# Patient Record
Sex: FEMALE | Race: OTHER | Employment: FULL TIME | URBAN - NONMETROPOLITAN AREA
[De-identification: names, ages, dates, MRNs, and addresses within clinical notes are randomized per-mention and may not be internally consistent; named-entity substitution may affect disease eponyms.]

---

## 2020-12-18 ENCOUNTER — HOSPITAL ENCOUNTER (EMERGENCY)
Age: 35
Discharge: HOME | End: 2020-12-18

## 2020-12-18 VITALS
OXYGEN SATURATION: 98 % | TEMPERATURE: 97.3 F | BODY MASS INDEX: 20.81 KG/M2 | SYSTOLIC BLOOD PRESSURE: 117 MMHG | RESPIRATION RATE: 16 BRPM | HEIGHT: 61 IN | HEART RATE: 80 BPM | DIASTOLIC BLOOD PRESSURE: 80 MMHG | WEIGHT: 110.23 LBS

## 2020-12-18 PROCEDURE — 99203 OFFICE O/P NEW LOW 30 MIN: CPT

## 2020-12-18 PROCEDURE — U0003 INFECTIOUS AGENT DETECTION BY NUCLEIC ACID (DNA OR RNA); SEVERE ACUTE RESPIRATORY SYNDROME CORONAVIRUS 2 (SARS-COV-2) (CORONAVIRUS DISEASE [COVID-19]), AMPLIFIED PROBE TECHNIQUE, MAKING USE OF HIGH THROUGHPUT TECHNOLOGIES AS DESCRIBED BY CMS-2020-01-R: HCPCS

## 2020-12-18 RX ORDER — ACETAMINOPHEN 500 MG
500 TABLET ORAL EVERY 6 HOURS PRN
COMMUNITY

## 2020-12-18 RX ORDER — IBUPROFEN 200 MG
200 TABLET ORAL EVERY 6 HOURS PRN
COMMUNITY
End: 2021-04-16

## 2020-12-18 ASSESSMENT — ENCOUNTER SYMPTOMS
NAUSEA: 0
CHEST TIGHTNESS: 0
SORE THROAT: 0
COUGH: 1
VOMITING: 0
DIARRHEA: 0
WHEEZING: 0
ABDOMINAL PAIN: 0
SHORTNESS OF BREATH: 0

## 2020-12-18 NOTE — ED PROVIDER NOTES
[address]  Urgent Care Encounter       CHIEF COMPLAINT       Chief Complaint   Patient presents with    Concern For COVID-19       Nurses Notes reviewed and I agree except as noted in the HPI. HISTORY OF PRESENT ILLNESS   Karuna Chacon is a 38 y.o. female who presents     Patient is present in the urgent care with concerns for possible Covid. Use of  is used during interview and assessment. Patient does have nonproductive cough that she has had intermittently for the last 2 to 3 days. Patient states that her place of employment has had recent outbreak of Covid, therefore she would like to be tested. She denies any fevers, or loss of smell or taste. Denies any body aches. Denies any history of ever smoking. REVIEW OF SYSTEMS     Review of Systems   Constitutional: Negative for chills, fatigue and fever. HENT: Negative for congestion, postnasal drip and sore throat. Respiratory: Positive for cough (Nonproductive, intermittent for the last 2 to 3 days). Negative for chest tightness, shortness of breath and wheezing. Cardiovascular: Negative for chest pain. Gastrointestinal: Negative for abdominal pain, diarrhea, nausea and vomiting. Musculoskeletal: Negative for myalgias. Skin: Negative for rash. Neurological: Negative for headaches. PAST MEDICAL HISTORY   History reviewed. No pertinent past medical history. SURGICALHISTORY     Patient  has no past surgical history on file. CURRENT MEDICATIONS       Discharge Medication List as of 12/18/2020  1:29 PM      CONTINUE these medications which have NOT CHANGED    Details   acetaminophen (TYLENOL) 500 MG tablet Take 500 mg by mouth every 6 hours as needed for PainHistorical Med      ibuprofen (ADVIL;MOTRIN) 200 MG tablet Take 200 mg by mouth every 6 hours as needed for PainHistorical Med             ALLERGIES     Patient is has No Known Allergies.     Patients   There is no immunization history on file for this patient. FAMILY HISTORY     Patient's family history is not on file. SOCIAL HISTORY     Patient  reports that she has never smoked. She has never used smokeless tobacco. She reports previous alcohol use. She reports previous drug use. PHYSICAL EXAM     ED TRIAGE VITALS  BP: 117/80, Temp: 97.3 °F (36.3 °C), Pulse: 80, Resp: 16, SpO2: 98 %,Estimated body mass index is 20.83 kg/m² as calculated from the following:    Height as of this encounter: 5' 1\" (1.549 m). Weight as of this encounter: 110 lb 3.7 oz (50 kg). ,No LMP recorded. Physical Exam  Constitutional:       General: She is not in acute distress. Appearance: Normal appearance. She is not ill-appearing, toxic-appearing or diaphoretic. HENT:      Nose: Nose normal.   Cardiovascular:      Rate and Rhythm: Normal rate. Pulses: Normal pulses. Heart sounds: Normal heart sounds. No murmur. No friction rub. No gallop. Pulmonary:      Effort: Pulmonary effort is normal. No respiratory distress. Breath sounds: Normal breath sounds. No stridor. No wheezing, rhonchi or rales. Chest:      Chest wall: No tenderness. Musculoskeletal: Normal range of motion. Skin:     General: Skin is warm. Neurological:      General: No focal deficit present. Mental Status: She is alert and oriented to person, place, and time. Sensory: No sensory deficit. Psychiatric:         Mood and Affect: Mood normal.         Behavior: Behavior normal.         Thought Content: Thought content normal.         Judgment: Judgment normal.         DIAGNOSTIC RESULTS     Labs:No results found for this visit on 12/18/20.     IMAGING:    No orders to display     URGENT CARE COURSE:     Vitals:    12/18/20 1301 12/18/20 1308   BP:  117/80   Pulse:  80   Resp:  16   Temp:  97.3 °F (36.3 °C)   TempSrc:  Temporal   SpO2:  98%   Weight: 110 lb 3.7 oz (50 kg)    Height: 5' 1\" (1.549 m)        Medications - No data to display PROCEDURES:  None    FINAL IMPRESSION      1. Suspected COVID-19 virus infection    2. Educated about COVID-19 virus infection    3. Exposure to COVID-19 virus    4. COVID-19 ruled out by laboratory testing          DISPOSITION/ PLAN   Patient is discharged home with instructions to self quarantine for the next 3 to 5 days with her pending COVID-19 test.  She should follow-up with her primary care provider if there is a positive Covid results, as well as health department. Recommend over-the-counter Tylenol, decongestants, and adequate fluid hydration for management of symptoms. If there is any shortness of breath, or fevers that cannot be controlled with over-the-counter Tylenol she go directly to the ER. PATIENT REFERRED TO:  No primary care provider on file. No primary physician on file.       DISCHARGE MEDICATIONS:  Discharge Medication List as of 12/18/2020  1:29 PM          Discharge Medication List as of 12/18/2020  1:29 PM          Discharge Medication List as of 12/18/2020  1:29 PM          RAYMON Gannon NP    (Please note that portions of this note were completed with a voice recognition program. Efforts were made to edit the dictations but occasionally words are mis-transcribed.)          RAYMON Loyola NP  12/18/20 [id number]

## 2020-12-19 ENCOUNTER — CARE COORDINATION (OUTPATIENT)
Dept: CARE COORDINATION | Age: 35
End: 2020-12-19

## 2020-12-20 LAB — SARS-COV-2: DETECTED

## 2020-12-21 NOTE — CARE COORDINATION
Second attempt in trying to reach out to the patient for a covid precaution call. No answer or voicemail, I have located the  number if needed. I also reached out to Germaine Ballesteros RN to assist me in giving the patient a positive Covid result if the patient does reach back out to me.

## 2021-04-16 ENCOUNTER — APPOINTMENT (OUTPATIENT)
Dept: ULTRASOUND IMAGING | Age: 36
End: 2021-04-16

## 2021-04-16 ENCOUNTER — HOSPITAL ENCOUNTER (EMERGENCY)
Age: 36
Discharge: HOME | End: 2021-04-16
Attending: EMERGENCY MEDICINE

## 2021-04-16 VITALS
HEART RATE: 70 BPM | TEMPERATURE: 98 F | BODY MASS INDEX: 21.73 KG/M2 | RESPIRATION RATE: 16 BRPM | SYSTOLIC BLOOD PRESSURE: 100 MMHG | DIASTOLIC BLOOD PRESSURE: 68 MMHG | OXYGEN SATURATION: 99 % | WEIGHT: 115 LBS

## 2021-04-16 DIAGNOSIS — N93.9 VAGINAL BLEEDING: ICD-10-CM

## 2021-04-16 DIAGNOSIS — O02.1 MISSED ABORTION: Primary | ICD-10-CM

## 2021-04-16 LAB
ABO: NORMAL
ANION GAP SERPL CALCULATED.3IONS-SCNC: 9 MEQ/L (ref 8–16)
ANTIBODY SCREEN: NORMAL
APTT: 25.9 SECONDS (ref 22–38)
BACTERIA: ABNORMAL
BASOPHILS # BLD: 0.5 %
BASOPHILS ABSOLUTE: 0.1 THOU/MM3 (ref 0–0.1)
BILIRUBIN URINE: NEGATIVE
BLOOD, URINE: ABNORMAL
BUN BLDV-MCNC: 8 MG/DL (ref 7–22)
CALCIUM SERPL-MCNC: 9.1 MG/DL (ref 8.5–10.5)
CASTS: ABNORMAL /LPF
CASTS: ABNORMAL /LPF
CHARACTER, URINE: ABNORMAL
CHLORIDE BLD-SCNC: 105 MEQ/L (ref 98–111)
CHP ED QC CHECK: NORMAL
CO2: 23 MEQ/L (ref 23–33)
COLOR: YELLOW
CREAT SERPL-MCNC: 0.7 MG/DL (ref 0.4–1.2)
CRYSTALS: ABNORMAL
EKG ATRIAL RATE: 72 BPM
EKG P AXIS: 57 DEGREES
EKG P-R INTERVAL: 154 MS
EKG Q-T INTERVAL: 388 MS
EKG QRS DURATION: 84 MS
EKG QTC CALCULATION (BAZETT): 424 MS
EKG R AXIS: 76 DEGREES
EKG T AXIS: 52 DEGREES
EKG VENTRICULAR RATE: 72 BPM
EOSINOPHIL # BLD: 2.1 %
EOSINOPHILS ABSOLUTE: 0.2 THOU/MM3 (ref 0–0.4)
EPITHELIAL CELLS, UA: ABNORMAL /HPF
ERYTHROCYTE [DISTWIDTH] IN BLOOD BY AUTOMATED COUNT: 12.6 % (ref 11.5–14.5)
ERYTHROCYTE [DISTWIDTH] IN BLOOD BY AUTOMATED COUNT: 42 FL (ref 35–45)
GFR SERPL CREATININE-BSD FRML MDRD: > 90 ML/MIN/1.73M2
GLUCOSE BLD-MCNC: 118 MG/DL (ref 70–108)
GLUCOSE BLD-MCNC: 123 MG/DL
GLUCOSE BLD-MCNC: 123 MG/DL (ref 70–108)
GLUCOSE, URINE: NEGATIVE MG/DL
HCG,BETA SUBUNIT,QUAL,SERUM: 1812 MIU/ML (ref 0–5)
HCT VFR BLD CALC: 38.8 % (ref 37–47)
HEMOGLOBIN: 12.6 GM/DL (ref 12–16)
IMMATURE GRANS (ABS): 0.04 THOU/MM3 (ref 0–0.07)
IMMATURE GRANULOCYTES: 0.4 %
INR BLD: 1.01 (ref 0.85–1.13)
KETONES, URINE: NEGATIVE
KOH PREP: NORMAL
LEUKOCYTE EST, POC: NEGATIVE
LYMPHOCYTES # BLD: 16.7 %
LYMPHOCYTES ABSOLUTE: 1.8 THOU/MM3 (ref 1–4.8)
MCH RBC QN AUTO: 29.8 PG (ref 26–33)
MCHC RBC AUTO-ENTMCNC: 32.5 GM/DL (ref 32.2–35.5)
MCV RBC AUTO: 91.7 FL (ref 81–99)
MISCELLANEOUS LAB TEST RESULT: ABNORMAL
MONOCYTES # BLD: 4.3 %
MONOCYTES ABSOLUTE: 0.5 THOU/MM3 (ref 0.4–1.3)
NITRITE, URINE: NEGATIVE
NUCLEATED RED BLOOD CELLS: 0 /100 WBC
OSMOLALITY CALCULATION: 273.2 MOSMOL/KG (ref 275–300)
PH UA: 8 (ref 5–9)
PLATELET # BLD: 337 THOU/MM3 (ref 130–400)
PMV BLD AUTO: 9 FL (ref 9.4–12.4)
POTASSIUM REFLEX MAGNESIUM: 4.1 MEQ/L (ref 3.5–5.2)
PREGNANCY, SERUM: POSITIVE
PROTEIN UA: NEGATIVE MG/DL
RBC # BLD: 4.23 MILL/MM3 (ref 4.2–5.4)
RBC URINE: ABNORMAL /HPF
RENAL EPITHELIAL, UA: ABNORMAL
RH FACTOR: NORMAL
SEG NEUTROPHILS: 76 %
SEGMENTED NEUTROPHILS ABSOLUTE COUNT: 8.1 THOU/MM3 (ref 1.8–7.7)
SODIUM BLD-SCNC: 137 MEQ/L (ref 135–145)
SPECIFIC GRAVITY UA: 1.01 (ref 1–1.03)
TRICHOMONAS PREP: NORMAL
TROPONIN T: < 0.01 NG/ML
UROBILINOGEN, URINE: 0.2 EU/DL (ref 0–1)
WBC # BLD: 10.7 THOU/MM3 (ref 4.8–10.8)
WBC UA: ABNORMAL /HPF
YEAST: ABNORMAL

## 2021-04-16 PROCEDURE — 99284 EMERGENCY DEPT VISIT MOD MDM: CPT

## 2021-04-16 PROCEDURE — 84484 ASSAY OF TROPONIN QUANT: CPT

## 2021-04-16 PROCEDURE — 81001 URINALYSIS AUTO W/SCOPE: CPT

## 2021-04-16 PROCEDURE — 86850 RBC ANTIBODY SCREEN: CPT

## 2021-04-16 PROCEDURE — 87253 VIRUS INOCULATE TISSUE ADDL: CPT

## 2021-04-16 PROCEDURE — 86900 BLOOD TYPING SEROLOGIC ABO: CPT

## 2021-04-16 PROCEDURE — 2580000003 HC RX 258: Performed by: STUDENT IN AN ORGANIZED HEALTH CARE EDUCATION/TRAINING PROGRAM

## 2021-04-16 PROCEDURE — 80048 BASIC METABOLIC PNL TOTAL CA: CPT

## 2021-04-16 PROCEDURE — 36415 COLL VENOUS BLD VENIPUNCTURE: CPT

## 2021-04-16 PROCEDURE — 93005 ELECTROCARDIOGRAM TRACING: CPT | Performed by: STUDENT IN AN ORGANIZED HEALTH CARE EDUCATION/TRAINING PROGRAM

## 2021-04-16 PROCEDURE — 86901 BLOOD TYPING SEROLOGIC RH(D): CPT

## 2021-04-16 PROCEDURE — 87491 CHLMYD TRACH DNA AMP PROBE: CPT

## 2021-04-16 PROCEDURE — 87252 VIRUS INOCULATION TISSUE: CPT

## 2021-04-16 PROCEDURE — 85610 PROTHROMBIN TIME: CPT

## 2021-04-16 PROCEDURE — 87210 SMEAR WET MOUNT SALINE/INK: CPT

## 2021-04-16 PROCEDURE — 99215 OFFICE O/P EST HI 40 MIN: CPT

## 2021-04-16 PROCEDURE — 6370000000 HC RX 637 (ALT 250 FOR IP): Performed by: STUDENT IN AN ORGANIZED HEALTH CARE EDUCATION/TRAINING PROGRAM

## 2021-04-16 PROCEDURE — 84702 CHORIONIC GONADOTROPIN TEST: CPT

## 2021-04-16 PROCEDURE — 87591 N.GONORRHOEAE DNA AMP PROB: CPT

## 2021-04-16 PROCEDURE — 85730 THROMBOPLASTIN TIME PARTIAL: CPT

## 2021-04-16 PROCEDURE — 87205 SMEAR GRAM STAIN: CPT

## 2021-04-16 PROCEDURE — 85025 COMPLETE CBC W/AUTO DIFF WBC: CPT

## 2021-04-16 PROCEDURE — 84703 CHORIONIC GONADOTROPIN ASSAY: CPT

## 2021-04-16 PROCEDURE — 87070 CULTURE OTHR SPECIMN AEROBIC: CPT

## 2021-04-16 PROCEDURE — 82948 REAGENT STRIP/BLOOD GLUCOSE: CPT

## 2021-04-16 PROCEDURE — 93010 ELECTROCARDIOGRAM REPORT: CPT | Performed by: NUCLEAR MEDICINE

## 2021-04-16 PROCEDURE — 76817 TRANSVAGINAL US OBSTETRIC: CPT

## 2021-04-16 PROCEDURE — 87529 HSV DNA AMP PROBE: CPT

## 2021-04-16 PROCEDURE — 87220 TISSUE EXAM FOR FUNGI: CPT

## 2021-04-16 RX ORDER — 0.9 % SODIUM CHLORIDE 0.9 %
1000 INTRAVENOUS SOLUTION INTRAVENOUS ONCE
Status: COMPLETED | OUTPATIENT
Start: 2021-04-16 | End: 2021-04-16

## 2021-04-16 RX ORDER — ACETAMINOPHEN 500 MG
1000 TABLET ORAL ONCE
Status: COMPLETED | OUTPATIENT
Start: 2021-04-16 | End: 2021-04-16

## 2021-04-16 RX ADMIN — SODIUM CHLORIDE 1000 ML: 9 INJECTION, SOLUTION INTRAVENOUS at 12:30

## 2021-04-16 RX ADMIN — ACETAMINOPHEN 1000 MG: 500 TABLET ORAL at 13:58

## 2021-04-16 ASSESSMENT — ENCOUNTER SYMPTOMS
SHORTNESS OF BREATH: 0
RHINORRHEA: 0
BACK PAIN: 0
EYE REDNESS: 0
BLOOD IN STOOL: 0
SINUS PAIN: 0
DIARRHEA: 0
VOMITING: 0
COUGH: 0
ABDOMINAL DISTENTION: 0
SORE THROAT: 0
NAUSEA: 0
CONSTIPATION: 0
TROUBLE SWALLOWING: 0
ABDOMINAL PAIN: 1

## 2021-04-16 ASSESSMENT — PAIN DESCRIPTION - ORIENTATION: ORIENTATION: LEFT

## 2021-04-16 ASSESSMENT — PAIN SCALES - GENERAL: PAINLEVEL_OUTOF10: 7

## 2021-04-16 ASSESSMENT — PAIN DESCRIPTION - PAIN TYPE: TYPE: ACUTE PAIN

## 2021-04-16 NOTE — ED NOTES
Giovanni Sanchez transferred via ambulance to [facility]'s report given to ambulance crew.      Jese Kamara LPN  48/07/74 [id number]

## 2021-04-16 NOTE — ED TRIAGE NOTES
Patient arrived to room 15 via [facility] transfer with c/o of vaginal bleeding. Patient needs to use  for communication.

## 2021-04-16 NOTE — ED PROVIDER NOTES
[facility] ENCOUNTER          Pt Name: Lynda Mata  MRN: [id number]  [place] 1985  Date of evaluation: 2021  Treating Resident Physician: Kia Malave MD  Supervising Physician: Dr. Que Zamorano       Chief Complaint   Patient presents with    Vaginal Bleeding     reported 3 month pregnancy     History obtained from the patient and  via an . HISTORY OF PRESENT ILLNESS    HPI  Lynda Mata is a 43 y.o. female who is G3,  who believes she is approximately 12 weeks pregnant based on a last known menstrual cycle of  that was normal for her who presents to the emergency department for evaluation of vaginal bleeding since last night going through approximately 8 pads. She also mentions passing to golf ball size clots this a.m. She also mentions having a syncopal episode today when she was sitting on the toilet and stood up states she woke up on the floor. Mentioned having some lightheadedness prior to the incident. She also complains of some bilateral breast pain that is nonradiating and nonexertional.    She denies any shortness of breath, palpitations, blood in her stool, dysuria, nausea, vomiting or diarrhea. She also denies having any headaches or vision changes. She is not followed by any obstetrician here and mentions in the past leaving her previous child to [facility] as well as her 1  states there is a story about her having a fall 4 years ago being seen in the hospital [facility] and having issues with the pregnancy described as cramping and bleeding and was advised by the physician there she needs an  and was given a pill to induce such. She denies any other complications with the pregnancy. The patient has no other acute complaints at this time. REVIEW OF SYSTEMS   Review of Systems   Constitutional: Negative for chills and fever.    HENT: Negative for congestion, rhinorrhea, sinus pain and sore throat. Eyes: Negative for redness. Respiratory: Negative for cough and shortness of breath. Cardiovascular: Positive for chest pain. Negative for palpitations and leg swelling. Gastrointestinal: Positive for abdominal pain. Negative for blood in stool, diarrhea, nausea and vomiting. Genitourinary: Positive for pelvic pain and vaginal bleeding. Negative for dysuria and vaginal discharge. Musculoskeletal: Negative for back pain. Skin: Negative for rash. Neurological: Positive for syncope and light-headedness. Negative for headaches. Psychiatric/Behavioral: Negative for agitation. PAST MEDICAL AND SURGICAL HISTORY   History reviewed. No pertinent past medical history. History reviewed. No pertinent surgical history. MEDICATIONS   No current facility-administered medications for this encounter. Current Outpatient Medications:     acetaminophen (TYLENOL) 500 MG tablet, Take 500 mg by mouth every 6 hours as needed for Pain, Disp: , Rfl:       SOCIAL HISTORY     Social History     Social History Narrative    Not on file     Social History     Tobacco Use    Smoking status: Never Smoker    Smokeless tobacco: Never Used   Substance Use Topics    Alcohol use: Not Currently    Drug use: Not Currently         ALLERGIES     Allergies   Allergen Reactions    Avelox [Moxifloxacin]      Per interpretor difficult to understand but believed to be correct         FAMILY HISTORY   History reviewed. No pertinent family history. PREVIOUS RECORDS   Previous records reviewed: She was seen here on 12/18/2020 for suspected COVID-19 virus.       PHYSICAL EXAM     ED Triage Vitals   BP Temp Temp Source Pulse Resp SpO2 Height Weight   04/16/21 1011 04/16/21 1011 04/16/21 1011 04/16/21 1011 04/16/21 1011 04/16/21 1011 -- 04/16/21 1058   110/75 98 °F (36.7 °C) Temporal 87 16 98 %  115 lb (52.2 kg)     Initial vital signs and nursing assessment reviewed and normal. Pulsoximetry is normal per my interpretation. Additional Vital Signs:  Vitals:    04/16/21 1357   BP: 99/62   Pulse: 74   Resp: 16   Temp:    SpO2: 99%       Physical Exam  Vitals signs and nursing note reviewed. Exam conducted with a chaperone present. Constitutional:       Appearance: She is ill-appearing. HENT:      Head: Normocephalic and atraumatic. Right Ear: External ear normal.      Left Ear: External ear normal.      Nose: Nose normal.      Mouth/Throat:      Mouth: Mucous membranes are dry. Pharynx: Oropharynx is clear. Eyes:      General: No scleral icterus. Conjunctiva/sclera: Conjunctivae normal.   Neck:      Musculoskeletal: Normal range of motion and neck supple. No neck rigidity or muscular tenderness. Cardiovascular:      Rate and Rhythm: Normal rate and regular rhythm. Pulses: Normal pulses. Heart sounds: Normal heart sounds. No murmur. Pulmonary:      Effort: Pulmonary effort is normal. No respiratory distress. Breath sounds: Normal breath sounds. No wheezing or rales. Chest:      Chest wall: No tenderness. Abdominal:      General: Abdomen is flat. Bowel sounds are normal. There is no distension. Palpations: Abdomen is soft. There is mass. Tenderness: There is abdominal tenderness. There is no guarding or rebound. Comments: Bilateral pelvic tenderness to palpation, there is also supra pelvic palpable mass that is firm, nontender. Genitourinary:     General: Normal vulva. Exam position: Lithotomy position. Vagina: No foreign body. No vaginal discharge or prolapsed vaginal walls. Cervix: Dilated. Cervical bleeding present. No cervical motion tenderness, discharge, friability, lesion or erythema. Musculoskeletal: Normal range of motion. Lymphadenopathy:      Cervical: No cervical adenopathy. Skin:     General: Skin is warm and dry.       Capillary Refill: Capillary refill takes less than 2 seconds. Neurological:      General: No focal deficit present. Mental Status: She is alert and oriented to person, place, and time. Psychiatric:         Mood and Affect: Mood normal.       MEDICAL DECISION MAKING   Initial Assessment: This is a 68-year-old female who is a G3, P1 AB 1 who is approximately 12 to 15 weeks pregnant per her last menstrual cycle being January 16 that was normal.  She has had no confirmatory test that she is pregnant has had no OB management of her current pregnancy. She is coming today for vaginal bleeding since yesterday as well as passing to clots the size of golf balls today. She is gone through 8 pads and had a syncopal episode when standing up from the toilet today. She also has some bilateral chest pain below both breasts as nonradiating and nonexertional in nature. Differential Diagnosis Included but not limited to: Anemia, syncope pregnancy, vasovagal, orthostatic hypotension, ectopic pregnancy, normal pregnancy, molar pregnancy, ovarian cyst, pulmonary embolism    MDM:   We will obtain some laboratory studies including a type and screen ABO/Rh. Also obtain EKG with patient's findings of chest pain. Patient is a poor historian and throughout our discussion with the  her significant other in the room continues to answer most of the questions for her. She is currently having some vaginal pain will order some Tylenol. Will also perform a bedside ultrasound in addition to the official ultrasound will be obtained.         ED RESULTS   Laboratory results:  Labs Reviewed   BASIC METABOLIC PANEL W/ REFLEX TO MG FOR LOW K - Abnormal; Notable for the following components:       Result Value    Glucose 118 (*)     All other components within normal limits   CBC WITH AUTO DIFFERENTIAL - Abnormal; Notable for the following components:    MPV 9.0 (*)     Segs Absolute 8.1 (*)     All other components within normal limits   HCG, QUANTITATIVE, PREGNANCY - Abnormal; Neutrophils 76.0   Lymphocytes 16.7   Monocytes 4.3   Eosinophils 2.1   Basophils 0.5   Immature Granulocytes 0.4   Segs Absolute 8.1(!)   Lymphocytes Absolute 1.8   Monocytes Absolute 0.5   Eosinophils Absolute 0.2   Basophils Absolute 0.1   Immature Grans (Abs) 0.04   Nucleated Red Blood Cells 0 [AL]   1255 Anion Gap: 9.0 [AL]   1255 Osmolality Calc(!): 273.2 [AL]   1255 aPTT: 25.9 [AL]   1255 Within normal limits. Basic Metabolic Panel w/ Reflex to MG(!):    Sodium 137   Potassium 4.1   Chloride 105   CO2 23   Glucose 118(!)   BUN 8   Creatinine 0.7   Calcium 9.1 [AL]   1255 Est, Glom Filt Rate: >90 [AL]   1255 INR: 1.01 [AL]   8065 hCG,Beta Subunit,Qual,Serum(!): 1812.0 [AL]   1314 Signs of infection, large amounts of blood in the urine. Microscopic Urinalysis(!):    Glucose, Urine NEGATIVE   Bilirubin, Urine NEGATIVE   Ketones, Urine NEGATIVE   Specific Gravity, UA 1.009   Blood, Urine LARGE(!)   pH, UA 8.0   Protein, UA NEGATIVE   Urobilinogen, Urine 0.2   Nitrite, Urine NEGATIVE   Leukocytes, UA NEGATIVE   Color, UA YELLOW   Character, Urine TURBID(!)   RBC, UA 0-2   WBC, UA 0-2   Epithelial Cells, UA 0-2   Bacteria, UA NONE SEEN   Casts NONE SEEN   Crystals NONE SEEN   Renal Epithelial, UA NONE SEEN   Yeast, Urine NONE SEEN   Casts NONE SEE. .. [AL]   0183 \"IMPRESSION:  No gestational sac is identified. Thickened endometrial stripe. Possible missed AB. Consider performing ultrasound after completion of menses to determine minimum endometrial stripe thickness and to exclude more persistent tissue abnormality. \"   Walter Reagan [AL]   2734 Patient was updated on laboratory results as well as her imaging results. Prolonged discussion occurred using an  the entire duration talk. Her questions were answered as well as her significant other's questions were answered. They will be given an OB for follow-up.   They are advised of the need to follow-up with them to trend hCG.    [AL]      ED

## 2021-04-16 NOTE — ED TRIAGE NOTES
Wesley Araujo arrives to room with complaint of vaginal bleeding ab pain 3 months pregnancy symptoms started 1 days ago.  used through out care.

## 2021-04-16 NOTE — ED NOTES
Bed: 015A  Expected date:   Expected time:   Means of arrival: [facility]  Comments:     Justin Orozco  04/16/21 1052

## 2021-04-16 NOTE — ED PROVIDER NOTES
Attending Supervising Physician's Attestation Statement  I performed a history and physical examination on the patient and discussed the management with the resident physician. I reviewed and agree with the findings and plan as documented in the resident physician note. This includes all diagnostic interpretations and treatment plans as written. I was present for the key portion of any procedures performed and the inclusive time noted in any critical care statement except as noted below. Brief H&P   This patient is a 43 y.o. Female with with concern for vaginal bleeding in pregnancy. Patient states that she believes she was pregnant as she has not had a period since January but then started to get bleeding that was typical of her period today. Patient is reportedly G3, P1. She states that she typically has regular periods, last normal period was . She did not take a home pregnancy test but since she had not been having her menses she assumed she must be pregnant. She said no prenatal care. In the past her first pregnancy she reports was a home birth with a midwife in [facility].  In her second pregnancy she reportedly fell late in pregnancy and states that the \"baby  inside of me\". By description it sounds as though patient underwent therapeutic /induced delivery due to fetal demise. Patient began having vaginal and lower abdominal cramping and bleeding. She is went through 8 pads today and passed to golf ball sized clots. She also reportedly felt lightheaded and did have an episode where she fell down. She is also had some chest discomfort under her breast.  No shortness of breath. No fever, chills, or vaginal discharge. The video  was used to obtain history due to patient being primarily Bulgarian-speaking. On my examination, resting in bed, smiling, in no acute distress. HEENT: Normocephalic, Atraumatic. Neck is supple without TTP.    Lungs: Clear and equal bilaterally. No increased work of breathing or respiratory distress. Heart: Rate and rhythm regular, no murmurs clicks or gallops  Abdomen: Soft, nondistended, nontender   Lower extremities: no edema, no tenderness to palpation.    Neuro: Awake and alert, no lateralizing deficits, cranial nerves II through XII grossly intact bilaterally    Diagnostics and Treatments      MEDICATIONS GIVEN:  Orders Placed This Encounter   Medications    0.9 % sodium chloride bolus    acetaminophen (TYLENOL) tablet 1,000 mg     LABS / RADIOLOGY:     Results for orders placed or performed during the hospital encounter of 23/57/63   Basic Metabolic Panel w/ Reflex to MG   Result Value Ref Range    Sodium 137 135 - 145 meq/L    Potassium reflex Magnesium 4.1 3.5 - 5.2 meq/L    Chloride 105 98 - 111 meq/L    CO2 23 23 - 33 meq/L    Glucose 118 (H) 70 - 108 mg/dL    BUN 8 7 - 22 mg/dL    CREATININE 0.7 0.4 - 1.2 mg/dL    Calcium 9.1 8.5 - 10.5 mg/dL   CBC Auto Differential   Result Value Ref Range    WBC 10.7 4.8 - 10.8 thou/mm3    RBC 4.23 4.20 - 5.40 mill/mm3    Hemoglobin 12.6 12.0 - 16.0 gm/dl    Hematocrit 38.8 37.0 - 47.0 %    MCV 91.7 81.0 - 99.0 fL    MCH 29.8 26.0 - 33.0 pg    MCHC 32.5 32.2 - 35.5 gm/dl    RDW-CV 12.6 11.5 - 14.5 %    RDW-SD 42.0 35.0 - 45.0 fL    Platelets 579 682 - 560 thou/mm3    MPV 9.0 (L) 9.4 - 12.4 fL    Seg Neutrophils 76.0 %    Lymphocytes 16.7 %    Monocytes 4.3 %    Eosinophils 2.1 %    Basophils 0.5 %    Immature Granulocytes 0.4 %    Segs Absolute 8.1 (H) 1 - 7 thou/mm3    Lymphocytes Absolute 1.8 1.0 - 4.8 thou/mm3    Monocytes Absolute 0.5 0.4 - 1.3 thou/mm3    Eosinophils Absolute 0.2 0.0 - 0.4 thou/mm3    Basophils Absolute 0.1 0.0 - 0.1 thou/mm3    Immature Grans (Abs) 0.04 0.00 - 0.07 thou/mm3    nRBC 0 /100 wbc   HCG Qualitative, Serum   Result Value Ref Range    Preg, Serum POSITIVE NEGATIVE   HCG, Quantitative, Pregnancy   Result Value Ref Range    hCG,Beta Subunit,Qual,Serum 1812.0 (H) 0.0 - 5.0 mIU/mL   Protime-INR   Result Value Ref Range    INR 1.01 0.85 - 1.13   APTT   Result Value Ref Range    aPTT 25.9 22.0 - 38.0 seconds   Troponin   Result Value Ref Range    Troponin T < 0.010 ng/ml   Anion Gap   Result Value Ref Range    Anion Gap 9.0 8.0 - 16.0 meq/L   Glomerular Filtration Rate, Estimated   Result Value Ref Range    Est, Glom Filt Rate >90 ml/min/1.73m2   Osmolality   Result Value Ref Range    Osmolality Calc 273.2 (L) 275.0 - 300.0 mOsmol/kg   Microscopic Urinalysis   Result Value Ref Range    Glucose, Urine NEGATIVE NEGATIVE mg/dl    Bilirubin Urine NEGATIVE NEGATIVE    Ketones, Urine NEGATIVE NEGATIVE    Specific Gravity, UA 1.009 1.002 - 1.030    Blood, Urine LARGE (A) NEGATIVE    pH, UA 8.0 5.0 - 9.0    Protein, UA NEGATIVE NEGATIVE mg/dl    Urobilinogen, Urine 0.2 0.0 - 1.0 eu/dl    Nitrite, Urine NEGATIVE NEGATIVE    Leukocytes, UA NEGATIVE NEGATIVE    Color, UA YELLOW YELLOW-STRAW    Character, Urine TURBID (A) [id number]    RBC, UA 0-2 0-2/hpf /hpf    WBC, UA 0-2 0-4/hpf /hpf    Epithelial Cells, UA 0-2 3-5/hpf /hpf    Bacteria, UA NONE SEEN FEW/NONE SEEN    Casts NONE SEEN NONE SEEN /lpf    Crystals NONE SEEN NONE SEEN    Renal Epithelial, UA NONE SEEN NONE SEEN    Yeast, UA NONE SEEN NONE SEEN    Casts NONE SEEN /lpf    Miscellaneous Lab Test Result NONE SEEN    POCT Glucose   Result Value Ref Range    Glucose 123 mg/dL    QC OK? y    POCT Glucose   Result Value Ref Range    POC Glucose 123 (H) 70 - 108 mg/dl   EKG 12 Lead   Result Value Ref Range    Ventricular Rate 72 BPM    Atrial Rate 72 BPM    P-R Interval 154 ms    QRS Duration 84 ms    Q-T Interval 388 ms    QTc Calculation (Bazett) 424 ms    P Axis 57 degrees    R Axis 76 degrees    T Axis 52 degrees   TYPE AND SCREEN   Result Value Ref Range    ABO O     Rh Factor POS     Antibody Screen NEG      No results found. Ultrasound reading:   Impression:    No gestational sac is identified.    Thickened endometrial stripe. Possible missed AB. Consider performing ultrasound after completion of menses to determine minimum endometrial stripe thickness and to exclude more persistent tissue abnormality. Medical Decision Making   MDM: Patient presents with vaginal bleeding, beta-hCG was obtained in addition to ABO Rh, pelvic exam performed per resident note. Ultrasound shows no gestational sac and thickened endometrial stripe, given patient's history and exam feel this is most consistent with missed AB. Patient will be provided follow-up with OB/GYN to ensure beta hCG becomes negative and for repeat imaging.  was used to provide patient with all of her test results and instructions for follow-up. Plan: As above. Please see the resident physician completed note for final disposition except as documented on this attestation. I have reviewed and interpreted all available lab, radiology and ekg results available at the moment. Diagnosis, treatment and disposition plans were discussed and agreed upon by patient. I personally saw and examined the patient. I have reviewed and agree with the resident's findings, including all diagnostic interpretations and treatment plans as written. I was present for the key portion of any procedures performed and the inclusive time noted in any critical care statement. This transcription was electronically signed. It was dictated by use of voice recognition software and electronically transcribed. The transcription may contain errors not detected in proofreading.        Electronically signed by Ranjan Decker MD on 4/16/21 at 1:01 PM NADEEM Paulino MD  04/16/21 [id number]

## 2021-04-16 NOTE — ED NOTES
Patient is going to [address] [facility] at this time.       Vinny Candelario RN  04/16/21 6330

## 2021-04-16 NOTE — ED PROVIDER NOTES
[facility]       Chief Complaint   Patient presents with    Vaginal Bleeding     reported 3 month pregnancy       Nurses Notes reviewed and I agree except as noted in the HPI. HISTORY OF PRESENT ILLNESS   Lili Woodward is a 43 y.o. female who presents with c/o abdominal pain and vaginal bleeding. History given by patient/significant other. HPI and exam using  services. Onset of symptoms last night, unchanged. Pain is aching, continuous. Rates 7/10 currently. States it was at 8/10 at its worst.    Associated vaginal bleeding. Bleeding was heavy last night. Only light bleeding now. No irritative voiding symptoms. Patient is pregnant. NEY 10/16/21. LMP on/around 1/9/21. No treatment prior to arrival.    REVIEW OF SYSTEMS     Review of Systems   Constitutional: Negative for fever. HENT: Negative for trouble swallowing. Respiratory: Negative for shortness of breath. Cardiovascular: Negative for chest pain. Gastrointestinal: Positive for abdominal pain. Negative for abdominal distention, constipation, diarrhea, nausea and vomiting. Genitourinary: Positive for vaginal bleeding. Negative for decreased urine volume, difficulty urinating, dysuria, flank pain, frequency, genital sores, hematuria, menstrual problem, pelvic pain, urgency, vaginal discharge and vaginal pain. Musculoskeletal: Negative for back pain, neck pain and neck stiffness. Skin: Negative for rash. Neurological: Negative for headaches. Hematological: Negative for adenopathy. Psychiatric/Behavioral: Negative for sleep disturbance. PAST MEDICAL HISTORY   History reviewed. No pertinent past medical history. SURGICAL HISTORY     Patient  has no past surgical history on file.     CURRENT MEDICATIONS       Previous Medications    ACETAMINOPHEN (TYLENOL) 500 MG TABLET    Take 500 mg by mouth every 6 hours as needed for Pain ALLERGIES     Patient is is allergic to avelox [moxifloxacin]. FAMILY HISTORY     Patient'sfamily history is not on file. SOCIAL HISTORY     Patient  reports that she has never smoked. She has never used smokeless tobacco. She reports previous alcohol use. She reports previous drug use. PHYSICAL EXAM     ED TRIAGE VITALS  BP: 110/75, Temp: 98 °F (36.7 °C), Pulse: 87, Resp: 16, SpO2: 98 %  Physical Exam  Vitals signs and nursing note reviewed. Constitutional:       General: She is not in acute distress. Appearance: Normal appearance. She is well-developed. She is not ill-appearing, toxic-appearing or diaphoretic. HENT:      Head: Normocephalic and atraumatic. Right Ear: Hearing, tympanic membrane, ear canal and external ear normal. No mastoid tenderness. No hemotympanum. Tympanic membrane is not perforated, erythematous or bulging. Left Ear: Hearing, tympanic membrane, ear canal and external ear normal. No mastoid tenderness. No hemotympanum. Tympanic membrane is not perforated, erythematous or bulging. Nose: Nose normal.      Mouth/Throat:      Mouth: Mucous membranes are moist.      Pharynx: Oropharynx is clear. Uvula midline. Eyes:      General: No scleral icterus. Conjunctiva/sclera: Conjunctivae normal.      Right eye: Right conjunctiva is not injected. No hemorrhage. Left eye: Left conjunctiva is not injected. No hemorrhage. Neck:      Musculoskeletal: Normal range of motion and neck supple. Thyroid: No thyromegaly. Trachea: Trachea normal.   Cardiovascular:      Rate and Rhythm: Normal rate and regular rhythm. No extrasystoles are present. Chest Wall: PMI is not displaced. Heart sounds: Normal heart sounds. No murmur. No friction rub. No gallop. Pulmonary:      Effort: Pulmonary effort is normal. No respiratory distress. Breath sounds: Normal breath sounds. Abdominal:      General: Bowel sounds are decreased. There is no distension. Palpations: Abdomen is soft. There is no hepatomegaly or splenomegaly. Tenderness: There is abdominal tenderness in the suprapubic area and left lower quadrant. There is no right CVA tenderness or left CVA tenderness. Hernia: No hernia is present. Musculoskeletal:      Lumbar back: Normal.   Lymphadenopathy:      Head:      Right side of head: No submental, submandibular, tonsillar or occipital adenopathy. Left side of head: No submental, submandibular, tonsillar or occipital adenopathy. Cervical: No cervical adenopathy. Upper Body:      Right upper body: No supraclavicular adenopathy. Left upper body: No supraclavicular adenopathy. Skin:     General: Skin is warm and dry. Capillary Refill: Capillary refill takes less than 2 seconds. Coloration: Skin is not jaundiced or pale. Findings: No rash. Comments: Skin warm and dry to touch, no rashes noted on exposed surfaces. Neurological:      Mental Status: She is alert and oriented to person, place, and time. She is not disoriented. Psychiatric:         Mood and Affect: Mood normal.         Behavior: Behavior normal. Behavior is cooperative. DIAGNOSTIC RESULTS   Labs: No results found for this visit on 04/16/21. IMAGING:  No orders to display     URGENT CARE COURSE:     Vitals:    04/16/21 1011   BP: 110/75   Pulse: 87   Resp: 16   Temp: 98 °F (36.7 °C)   TempSrc: Temporal   SpO2: 98%       Medications - No data to display  PROCEDURES:  None  FINALIMPRESSION      1. Vaginal bleeding in pregnancy    2. Abdominal pain during pregnancy, antepartum        DISPOSITION/PLAN   DISPOSITION Decision To Transfer 04/16/2021 10:17:06 AM    Patient is going to [facility] ED for further evaluation of abdominal pain/bleeding in pregnancy. She is in need of higher level of care. Patient to be transported by EMS. Report called to [facility] LPN.     PATIENT REFERRED TO:  [facility] EMERGENCY DEPT  [address] [facility]  [phone]        DISCHARGE MEDICATIONS:  New Prescriptions    No medications on file     Current Discharge Medication List          [address], APRN - CNP  04/16/21 1037

## 2021-04-19 LAB
GENITAL CULTURE, ROUTINE: NORMAL
GRAM STAIN RESULT: NORMAL

## 2021-04-20 LAB
CHLAMYDIA TRACHOMATIS AMPLIFIED DET: NEGATIVE
MISC. #1 REFERENCE GROUP TEST: NORMAL
NEISSERIA GONORRHOEAE BY AMP: NEGATIVE
SPECIMEN SOURCE: NORMAL